# Patient Record
(demographics unavailable — no encounter records)

---

## 2024-12-28 NOTE — ED.PDOC
OB/GYN


HPI Comments


25Y F presents to ED with chief complaint vaginal bleeding that began today. 

Additional symptom includes pelvic pain. Pt states she woke up with the vaginal 

bleeding. Pt denies n/v/d, chest pain, and SOB. Pt is currently 15 weeks 

pregnant with LMP 2024. OB/GYN hx . Pt denies recent physical 

activity. Pt is taking prenatal vitamins. Last OB appt was yesterday.


Chief Complaint:  Vaginal Bleed


Time Seen by MD:  11:07


Reviewed Notes:  Nurses Notes, Medications, Allergies


Allergies:  


Coded Allergies:  


     Bee Venom (Verified  Allergy, Unknown, 23)


Information Source:  Patient


Mode of Arrival:  Ambulatory


Timing:  Days


Severity:  Moderate


Vaginal Discharge:  None


Vaginal Lesions:  None


Bleeding Quality:  Bright Red


Vaginal Mass:  None


Onset Of Mass/Bleeding:  Spontaneous


Sexual Activity:  Pregnant


Last Consensual [place]:  Unknown


Birth Control:  None


History of:  Current Pregnancy


Blood Type:  Unknown


Associated Signs and Symptoms:  Vaginal Bleeding, Other (pelvic pain)





Past Medical History


PAST MEDICAL HISTORY:  Denies


Surgical History:  Denies all surgeries


GYN History:  No Pertinent GYN History





Family History


Family History:  Unknown





Social History


Smoker:  Non-Smoker


Alcohol:  Denies ETOH Use


Drugs:  Denies Drug Use


Lives In:  Home





Constitutional:  denies: chills, diaphoresis, fatigue, fever, malaise, sweats, 

weakness, others


EENTM:  denies: blurred vision, double vision, ear bleeding, ear discharge, ear 

drainage, ear pain, ear ringing, eye pain, eye redness, hearing loss, mouth 

pain, mouth swelling, nasal discharge, nose bleeding, nose congestion, nose pa

in, photophobia, tearing, throat pain, throat swelling, voice changes, others


Respiratory:  denies: cough, hemoptysis, orthopnea, SOB at rest, shortness of 

breath, SOB with excertion, stridor, wheezing, others


Cardiovascular:  denies: chest pain, dizzy spells, diaphoresis, Dyspnea on 

exertion, edema, irregular heart beat, left arm pain, lightheadedness, 

palpitations, PND, syncope, others


Gastrointestinal:  denies: abdomen distended, abdominal pain, blood streaked 

bowels, constipated, diarrhea, dysphagia, difficulty swallowing, hematemesis, 

melena, nausea, poor appetite, poor fluid intake, rectal bleeding, rectal pain, 

vomiting, others


Genitourinary:  reports: abnormal vagina bleeding, pain, pregnant; denies: 

burning, dyspareunia, dysuria, flank pain, frequency, hematuria, incontinence, 

vagina discharge, urgency, others


Neurological:  denies: dizziness, fainting, headache, left sided numbness, left 

sided weakness, numbness, paresthesia, pre-existing deficit, right sided 

numbness, right sided weakness, seizure, speech problems, tingling, tremors, 

weakness, others


Musculoskeletal:  denies: back pain, gout, joint pain, joint swelling, muscle 

pain, muscle stiffness, neck pain, others


Integumetry:  denies: bruises, change in color, change in hair/nails, dryness, 

laceration, lesions, lumps, rash, wounds, others


Allergic/Immunocompromised:  denies: Difficulty Healing, Frequent Infections, 

Hives, Itching, others


Hematologic/Lymphatic:  denies: anemia, blood clots, easy bleeding, easy 

bruising, swollen glands, others


Endocrine:  denies: excessive hunger, excessive sweating, excessive thirst, 

excessive urination, flushing, intolerance to cold, intolerance to heat, 

unexplained weight gain, unexplained weight loss, others


Psychiatric:  denies: anxiety, bipolar disorder, depression, hopeless, panic 

disorder, schizophrenia, sleepless, suicidal, others


All Other Systems:  Reviewed and Negative





Physical Exam


General Appearance:  Moderate Distress, Normal


HEENT:  Normal ENT Inspection, Pharynx Normal, TMs Normal


Neck:  Full Range of Motion, Non-Tender, Normal, Normal Inspection


Respiratory:  Chest Non-Tender, Lungs Clear, No Accessory Muscle Use, No 

Respiratory Distress, Normal Breath Sounds


Cardiovascular:  No Edema, No JVD, No Murmur, No Gallop, Normal Peripheral 

Pulses, Regular Rate/Rhythm


Breast Exam:  Deferred


Gastrointestinal:  No Organomegaly, Non Tender, No Pulsatile Mass, Normal Bowel 

Sounds, Soft


Genitalia:  Deferred


Pelvic:  Deferred


Rectal:  Deferred


Extremities:  No calf tenderness, Normal capillary refill, Normal inspection, 

Normal range of motion, Non-tender, No pedal edema


Musculoskeletal :  


   Apperance:  Normal


Neurologic:  Alert, CNs II-XII nml as Tested, No Motor Deficits, Normal Affect, 

Normal Mood, No Sensory Deficits


Cerebellar Function:  Normal


Reflexes:  Normal


Skin:  Dry, Normal Color, Warm


Peripheral Pulses:  3+ Radial (R), 3+ Radial (L)


Lymphatic:  No Adenopathy





Was a procedure done?


Was a procedure done?:  No





Differential Diagnosis (GYN)


Vaginal Bleeding:   - Complete,  - Incomplete,  - 

Inevitable,  - Missed,  - Threatened, Abruptio Placentae, 

Placenta Previa





X-Ray, Labs, Meds, VS





                                   Vital Signs








  Date Time  Temp Pulse Resp B/P (MAP) Pulse Ox O2 Delivery O2 Flow Rate FiO2


 


24 15:38 98.2 85 16 139/74 (95) 99   





 98.2       


 


24 10:28 98.0 97 18 120/58 (78) 97   








                                       Lab








Test


 24


11:00 24


10:39 Range/Units


 


 


Urine Color Yellow   Yellow  


 


Urine Clarity Turbid H  Clear  


 


Urine pH 6.5   5.0-9.0  


 


Urine Specific Gravity 1.023   1.001-1.035  


 


Urine Protein 1+ H  Negative  


 


Urine Ketones Negative   Negative  


 


Urine Blood 3+ H  Negative  /uL


 


Urine Nitrite Negative   Negative  


 


Urine Bilirubin Negative   Negative  


 


Urine Urobilinogen 2 H  Negative  mg/dL


 


Urine Leukocyte Esterase 3+   Negative  /uL


 


Urine RBC 1047   0 - 4  /hpf


 


Urine    0 - 5  /hpf


 


Urine Squamous Epithelial


Cells Few 


 


 <5  /hpf





 


Urine Bacteria None seen   None Seen  /hpf


 


Urine Mucus Few   None Seen  


 


Urine Glucose Normal   Normal  mg/dL


 


White Blood Count


 


 10.2 


 4.4-10.8


10^3/uL


 


Red Blood Count


 


 3.96 L


 4.0-5.20


10^6/uL


 


Hemoglobin  11.5 L 12.2-16.2  g/dL


 


Hematocrit  34.0 L 36.0-46.0  %


 


Mean Corpuscular Volume  85.7  80.0-100.0  fL


 


Mean Corpuscular Hemoglobin  29.1  28.0-32.0  pg


 


Mean Corpuscular Hemoglobin


Concent 


 33.9 


 32.0-36.0  g/dL





 


Red Cell Distribution Width  13.9  11.8-14.3  %


 


Platelet Count


 


 288 


 140-450


10^3/uL


 


Mean Platelet Volume  9.6  6.9-10.8  fL


 


Neutrophils (%) (Auto)  77.9  37.0-80.0  %


 


Lymphocytes (%) (Auto)  15.3  10.0-50.0  %


 


Monocytes (%) (Auto)  5.3  0.0-12.0  %


 


Eosinophils (%) (Auto)  1.3  0.0-7.0  %


 


Basophils (%) (Auto)  0.2  0.0-2.0  %


 


Neutrophils # (Auto)


 


 8.0 


 1.6-8.6  10


^3/uL


 


Lymphocytes # (Auto)


 


 1.6 


 0.4-5.4  10


^3/uL


 


Monocytes # (Auto)  0.5  0-1.3  10 ^3/uL


 


Eosinophils # (Auto)  0.1  0-0.8  10 ^3/uL


 


Basophils # (Auto)  0  0-0.2  10 ^3/uL


 


Nucleated Red Blood Cells  0.0   %


 


Sodium Level  140  136-145  mmol/L


 


Potassium Level  3.8  3.5-5.1  mmol/L


 


Chloride Level  108 H   mmol/L


 


Carbon Dioxide Level  22  20-31  mmol/L


 


Anion Gap  10  5-15  


 


Blood Urea Nitrogen  < 5 L 9-23  mg/dL


 


Creatinine


 


 0.57 


 0.550-1.02


mg/dL


 


Glomerular Filtration Rate


Calc 


 129 


 >90  mL/min





 


BUN/Creatinine Ratio  8.8 L 10.0-20.0  


 


Serum Glucose  105    mg/dL


 


Calcium Level  9.5  8.7-10.4  mg/dL


 


Beta HCG, Quantitative  36525.9 H 1.5-4.2  mIU/mL





OB US: FINDINGS:





IUP single live fetus at 15 weeks 6 days based on composite averages of the BPD,

head circumference, abdominal circumference and femur length.





Estimated fetal weight 134.48 grams.





Fetal heart rate 150 beats per minute.





Amniotic fluid is grossly adequate.





Cervix measures 3.3 cm in length. 





Breech presentation





Grade 1 placenta without previa or abruption, in posterior position. Small 

hypoechoic to anechoic area in the placenta measuring up to 0.7 cm.





IMPRESSION: 





1. IUP single live fetus at 15 weeks 6 days AUA corresponding to an EZEKIEL of 

06/15/2025





2. Small hypoechoic to anechoic area in the placenta measuring up to 0.7 cm. 

Evidence of placenta previa or abruption.











Patient alert.  


Complaining of vaginal bleeding.  


Vitals stable.  


Answering all questions.  


Ultrasound reveals normal pregnancy.  


Explained to the patient.  


Urinalysis shows UTI.  


Was given prescription of Keflex antibiotic.  


Explained to the patient.  


Was told to follow up with her primary care physician.  


Was told to come back if there is any problem.


Time of 1ST Reevaluation:  11:37


Reevaluation 1ST:  Unchanged


Patient Education/Counseling:  Diagnosis, Treatment


Family Education/Counseling:  No Family Present





Departure 1


Departure


Time of Disposition:  16:24


Impression:  


   Primary Impression:  


   Normal pregnancy


   Qualified Codes:  Z34.90 - Encounter for supervision of normal pregnancy, 

   unspecified, unspecified trimester


   Additional Impression:  


   Urinary tract infection


   Qualified Codes:  N30.01 - Acute cystitis with hematuria


Disposition:   HOME / SELF CARE / HOMELESS


Condition:  Good


e-Prescriptions


Cephalexin (KEFLEX CAPSULE) 250 Mg Cp


250 MG PO QID for 5 Days, #20 BOTTLE


   Prov: PABLO CEE MD         24


Discharged With:  Self





Critical Care Note


Critical Care Time?:  No





Stability


Stability form required:  No





Heart Score


Heart Score:  








Heart Score Response (Comments) Value


 


History N/A 0


 


EKG N/A 0


 


Age N/A 0


 


Risk Factors N/A 0


 


Troponin N/A 0


 


Total  0














I personally scribed for PABLO CEE MD ([email or web address]) on 24 at 11:32.  

Electronically submitted by Elida Lima ([email or web address]).


I personally scribed for PABLO CEE MD ([email or web address]) on 24 at 13:23.  

Electronically submitted by Ra Barreto ([email or web address]).


I personally scribed for PABLO CEE MD ([email or web address]) on 24 at 13:42.  

Electronically submitted by Emily Reyes ([email or web address]).





PABLO CEE MD           Dec 28, 2024 11:32

## 2024-12-28 NOTE — DVH
LIMITED OB ULTRASOUND > 14 WKS: 



HISTORY: VAGINAL SPOTTING



TECHNIQUE: Multiple real-time grayscale images of the gravid uterus with duplex Doppler color flow an
d M-mode spectral analysis.



COMPARISON: None



FINDINGS:



IUP single live fetus at 15 weeks 6 days based on composite averages of the BPD, head circumference, 
abdominal circumference and femur length.



Estimated fetal weight 134.48 grams.



Fetal heart rate 150 beats per minute.



Amniotic fluid is grossly adequate.



Cervix measures 3.3 cm in length. 



Breech presentation



Grade 1 placenta without previa or abruption, in posterior position. Small hypoechoic to anechoic are
a in the placenta measuring up to 0.7 cm.



IMPRESSION: 



1. IUP single live fetus at 15 weeks 6 days AUA corresponding to an EZEKIEL of 06/15/2025



2. Small hypoechoic to anechoic area in the placenta measuring up to 0.7 cm. Evidence of placenta pre
via or abruption.



Electronically Signed by: Mauri Mays at 12/28/2024 11:25:46 AM

## 2025-03-29 NOTE — DVHDS2
Physician Discharge Progress N


Final Diagnosis:





ABD PAIN,IUP AT 28WKS





Operations or Procedures:


Operations or Procedures


NST 28 WKS,SONO





Condition on Discharge:


Good





Disposition:


Home





Discharge Instructions:


Diet:  Regular


Activity:  No Restrictions, As Tolerated


Medications:  


NA





Follow Up Care:


Specialist:


2D


Discharge Statement:


"Patient was advised to return to the ER or call 911 if any headaches, 

dizziness, shortness of breath, chest pain, abdominal pain, bleeding, fevers, or

worsening of medical condition.





Patient was counseled about treatment plan, medications, possible side effects, 

patientverbalized understanding. All questions were answered to the best of my 

ability.





This discharge took greater then 30 minutes in planning, reviewing document

ation, counseling the patient, and discussing with other team members."





Visit Coding OBGYN


Date of Service:  Mar 28, 2025


Billing Provider:  JAMSHID BLANCO DO


OB/GYN Common Visit Codes:  99223-INITIAL  INP/OBS CARE (HIGH)


OB/GYN Procedure Codes:  90124-02- FETAL NON-STRESS TEST











JAMSHID BLANCO DO                 Mar 29, 2025 11:48